# Patient Record
Sex: MALE | Race: WHITE | Employment: OTHER | URBAN - METROPOLITAN AREA
[De-identification: names, ages, dates, MRNs, and addresses within clinical notes are randomized per-mention and may not be internally consistent; named-entity substitution may affect disease eponyms.]

---

## 2020-07-01 ENCOUNTER — TELEPHONE ENCOUNTER (OUTPATIENT)
Dept: URBAN - METROPOLITAN AREA CLINIC 9 | Facility: CLINIC | Age: 85
End: 2020-07-01

## 2020-07-08 ENCOUNTER — TELEPHONE ENCOUNTER (OUTPATIENT)
Dept: URBAN - METROPOLITAN AREA CLINIC 9 | Facility: CLINIC | Age: 85
End: 2020-07-08

## 2021-01-10 ENCOUNTER — TELEPHONE ENCOUNTER (OUTPATIENT)
Dept: URBAN - METROPOLITAN AREA CLINIC 9 | Facility: CLINIC | Age: 86
End: 2021-01-10

## 2022-07-30 ENCOUNTER — TELEPHONE ENCOUNTER (OUTPATIENT)
Age: 87
End: 2022-07-30

## 2022-07-31 ENCOUNTER — TELEPHONE ENCOUNTER (OUTPATIENT)
Age: 87
End: 2022-07-31

## 2022-07-31 RX ORDER — FAMOTIDINE 40 MG/1
1 (ONE) TABLET, FILM COATED ORAL
Qty: 0 | Refills: 7 | Status: ACTIVE | COMMUNITY
Start: 2020-07-01

## 2022-07-31 RX ORDER — OMEPRAZOLE 40 MG/1
1 (ONE) CAPSULE, DELAYED RELEASE ORAL
Qty: 0 | Refills: 3 | Status: ACTIVE | COMMUNITY
Start: 2020-07-01

## 2022-12-01 ENCOUNTER — DASHBOARD ENCOUNTERS (OUTPATIENT)
Age: 87
End: 2022-12-01

## 2022-12-01 ENCOUNTER — OFFICE VISIT (OUTPATIENT)
Dept: URBAN - METROPOLITAN AREA CLINIC 9 | Facility: CLINIC | Age: 87
End: 2022-12-01
Payer: MEDICARE

## 2022-12-01 ENCOUNTER — WEB ENCOUNTER (OUTPATIENT)
Dept: URBAN - METROPOLITAN AREA CLINIC 9 | Facility: CLINIC | Age: 87
End: 2022-12-01

## 2022-12-01 VITALS
DIASTOLIC BLOOD PRESSURE: 70 MMHG | SYSTOLIC BLOOD PRESSURE: 118 MMHG | HEIGHT: 71 IN | BODY MASS INDEX: 26.18 KG/M2 | WEIGHT: 187 LBS

## 2022-12-01 DIAGNOSIS — K22.70 BARRETT'S ESOPHAGUS: ICD-10-CM

## 2022-12-01 DIAGNOSIS — K21.9 GASTROESOPHAGEAL REFLUX DISEASE: ICD-10-CM

## 2022-12-01 DIAGNOSIS — Z86.010 PERSONAL HISTORY OF COLONIC POLYPS: ICD-10-CM

## 2022-12-01 PROCEDURE — 99204 OFFICE O/P NEW MOD 45 MIN: CPT | Performed by: INTERNAL MEDICINE

## 2022-12-01 RX ORDER — OMEPRAZOLE 40 MG/1
1 (ONE) CAPSULE, DELAYED RELEASE ORAL
Qty: 0 | Refills: 3 | Status: ACTIVE | COMMUNITY
Start: 2020-07-01

## 2022-12-01 RX ORDER — FAMOTIDINE 40 MG/1
1 (ONE) TABLET, FILM COATED ORAL
Qty: 0 | Refills: 7 | Status: ON HOLD | COMMUNITY
Start: 2020-07-01

## 2022-12-01 RX ORDER — OMEPRAZOLE 40 MG/1
1 (ONE) CAPSULE, DELAYED RELEASE ORAL
OUTPATIENT
Start: 2020-07-01

## 2022-12-01 NOTE — HPI-TODAY'S VISIT:
Pt here for evaluation of gerd, worsening, and also Kendrick's . Pt with hx/o SHAMAR . Hx/o colon polyps, last colon in 2014. pt did a cologuard recently, no longer doing colonoscopies. . Pt is s/p Nissen x 2 . EGD 2019 with no kendrick's. . Pt here and on omeprazole 40mg every day but despite this he now is having some increased gerd sx and epigastric abd pain. he has not had a recent EGD and due to kendrick's and lack of improvement on PPI I advised EGD for evaluation. He is agreeable. RTC pending the course.  .

## 2022-12-02 PROBLEM — 302914006 BARRETT ESOPHAGUS: Status: ACTIVE | Noted: 2022-12-02

## 2022-12-09 ENCOUNTER — CLAIMS CREATED FROM THE CLAIM WINDOW (OUTPATIENT)
Dept: URBAN - METROPOLITAN AREA SURGERY CENTER 9 | Facility: SURGERY CENTER | Age: 87
End: 2022-12-09
Payer: MEDICARE

## 2022-12-09 ENCOUNTER — CLAIMS CREATED FROM THE CLAIM WINDOW (OUTPATIENT)
Dept: URBAN - METROPOLITAN AREA CLINIC 4 | Facility: CLINIC | Age: 87
End: 2022-12-09
Payer: MEDICARE

## 2022-12-09 DIAGNOSIS — Z98.890 HISTORY OF NISSEN FUNDOPLICATION: ICD-10-CM

## 2022-12-09 DIAGNOSIS — K22.89 DILATATION OF ESOPHAGUS: ICD-10-CM

## 2022-12-09 DIAGNOSIS — K21.9 ACID REFLUX: ICD-10-CM

## 2022-12-09 DIAGNOSIS — K31.7 BENIGN GASTRIC POLYP: ICD-10-CM

## 2022-12-09 DIAGNOSIS — K31.7 POLYP OF STOMACH AND DUODENUM: ICD-10-CM

## 2022-12-09 PROCEDURE — 43239 EGD BIOPSY SINGLE/MULTIPLE: CPT | Performed by: CLINIC/CENTER

## 2022-12-09 PROCEDURE — 88305 TISSUE EXAM BY PATHOLOGIST: CPT | Performed by: PATHOLOGY

## 2022-12-09 PROCEDURE — 43239 EGD BIOPSY SINGLE/MULTIPLE: CPT | Performed by: INTERNAL MEDICINE

## 2022-12-09 RX ORDER — FAMOTIDINE 40 MG/1
1 (ONE) TABLET, FILM COATED ORAL
Qty: 0 | Refills: 7 | Status: ON HOLD | COMMUNITY
Start: 2020-07-01

## 2022-12-09 RX ORDER — OMEPRAZOLE 40 MG/1
1 (ONE) CAPSULE, DELAYED RELEASE ORAL
Status: ACTIVE | COMMUNITY
Start: 2020-07-01